# Patient Record
(demographics unavailable — no encounter records)

---

## 2024-10-22 NOTE — HISTORY OF PRESENT ILLNESS
[de-identified] : Patient referred by Dr. Marcus for evaluation of hyperparathyroidism and enlarging multinodular goiter.  Patient underwent bilateral thyroid biopsies September 2021 right nodule 3.5 cm left 2.6 cm both benign.  Repeat ultrasound November 2023: Right lobe 7.1 x 3.2 x 2.9 cm with multiple nodules mid 2.2 x 1.6 x 1.2 cm.  Left lobe 8.1 x 2.7 x 2.4 cm increased from prior study with mid 2 x 2.1 x 2.6 cm nodule and isthmus nodule measuring 2.8 x 2.7 x 2.5 cm and 1.6 x 2 x 2.1 cm.  Blood work November 2023: TSH 0.2, calcium 9.4, free T4 1.1, free T3 3.4.  Patient has been maintained on 5 mg of methimazole daily since August 2022.  Patient denies dysphagia, change in voice, shortness of breath, radiation exposure, palpitations or change in vision. 8/2024 US with increase in left lower nodule now 3 cm.  returns for FNA .  I have reviewed all old and new data and available images.

## 2024-10-22 NOTE — ASSESSMENT
[FreeTextEntry1] : Patient with several year history of hypothyroidism and multinodular goiter with gradual increase in size. US guided FNA performed.  if bneig, will repeat US 4/2025.  if enlarging, we will rediscuss total thyroidectomy for definitive diagnosis and treatment.  I have reviewed the pathophysiology of the disease process, the area anatomy and the rationale for surgery.  I discussed the risks, benefits and alternative treatments which include but are not limited to bleeding, infection, numbness, hoarseness, hypocalcemia, scarring, and need for reoperation.  I have answered the patient's questions to their satisfaction.  if no surgery. RTO 6 mo.

## 2024-10-22 NOTE — PHYSICAL EXAM
[de-identified] : No cervical or supraclavicular adenopathy, trachea midline with left greater than right thyroid enlargement with palpable nodules. [Normal] : no neck adenopathy [de-identified] : Skin:  normal appearance.  no rash, nodules, vesicles, or erythema, Musculoskeletal:  full range of motion and no deformities appreciated Neurological:  grossly intact Psychiatric:  oriented to person, place and time with appropriate affect

## 2024-10-22 NOTE — PROCEDURE
[FreeTextEntry1] : US guided thyroid FNA [FreeTextEntry2] : enlarging left lower nodule 3 cm [FreeTextEntry3] : Patient for thyroid biopsy. Risks benefits and alternatives discussed including bleeding, infection, swelling and need for repeat biopsy. Questions answered. Consent obtained, timeout taken.  Patient supine. No anesthetic used.  Nodule localized with US guidance Sterile technique with Betadine skin prep. 22-gauge needle under ultrasound guidance with a single pass. Slides prepared sent to cytology.  Post procedure: Hemostasis obtained, patient tolerated well, no complications. Post procedure instructions given.

## 2024-12-19 NOTE — DATA REVIEWED
[FreeTextEntry1] : Outside venous duplex studies performed as recently as 11/2022 demonstrated no evidence of reflux, thrombus, or varicose veins in either leg.  Studies scanned into [employer].

## 2024-12-19 NOTE — REASON FOR VISIT
[Consultation] : a consultation visit [FreeTextEntry1] : L lateral calf discoloration, tingling in the LLE

## 2025-04-24 NOTE — HISTORY OF PRESENT ILLNESS
[Former] : former [TextBox_4] : Here for f/u was sick last week and at 1st went to [facility] and had negative Xray and was given prednisone which was not helping was on 5 days and ended up going to ER Friday and was there overnight and d/c on Augmentin bid and azithromycin and prednisone 20 mg bid and today last day. 3 days. 8 days total prednisone Cough is improved. Positive rhinitis. last night less mucus and less sob viral panel negative had CT chest  Wheezing and rattling resolved.  Mild PAZ up hills Not regularly exercising.   [TextBox_11] : 1 [TextBox_13] : 4 [YearQuit] : 1960

## 2025-04-24 NOTE — PHYSICAL EXAM
[No Acute Distress] : no acute distress [Supple] : supple [No JVD] : no jvd [Normal S1, S2] : normal s1, s2 [No Murmurs] : no murmurs [Normal to Percussion] : normal to percussion [No Abnormalities] : no abnormalities [Benign] : benign [Not Tender] : not tender [No HSM] : no hsm [No Clubbing] : no clubbing [No Cyanosis] : no cyanosis [No Edema] : no edema [TextBox_68] : Occ wheeze predom. on forced exp.

## 2025-04-24 NOTE — DISCUSSION/SUMMARY
[FreeTextEntry1] : Pneumonia.  Bilateral groundglass opacities.  Likely infectious in origin. Old granulomatous disease clinically radiographically and functionally stable Dyspnea on exertion likely related to weight and conditioning.  Recommend cardiology follow-up. Cough still present but improved. Thyroid disease without evidence of upper airway obstruction.

## 2025-04-24 NOTE — ASSESSMENT
[FreeTextEntry1] : Start symbicort 2 BID Will need f/u CT F/U 3 weeks sooner PRN Hold on further corticosteroid therapy at this time. Going to travel in June.  Continue program of exercise and weight loss. Cardiology follow-up.

## 2025-04-24 NOTE — HISTORY OF PRESENT ILLNESS
[Former] : former [TextBox_4] : Here for f/u was sick last week and at 1st went to [facility] and had negative Xray and was given prednisone which was not helping was on 5 days and ended up going to ER Friday and was there overnight and d/c on Augmentin bid and azithromycin and prednisone 20 mg bid and today last day. 3 days. 8 days total prednisone Cough is improved. Positive rhinitis. last night less mucus and less sob viral panel negative had CT chest  Wheezing and rattling resolved.  Mild PAZ up hills Not regularly exercising.   [TextBox_11] : 1 [TextBox_13] : 4 [YearQuit] : 1960

## 2025-04-24 NOTE — PROCEDURE
[FreeTextEntry1] : 07/26/2024 Pulmonary function testing FEV1, FVC, and FEV1/FVC are within normal limits. TLC and subdivisions are normal. RV/TLC ratio is normal. Single breath diffusion capacity is normal.  No significant change in function compared to June 2023.     ACC: [id number]     EXAM:  CT CHEST   ORDERED BY: LES CALVIN  PROCEDURE DATE:  04/18/2025    INTERPRETATION:  CLINICAL INFORMATION: Shortness of breath, wheezing, history of sarcoidosis  COMPARISON: CT chest 4/2/2019, ultrasound neck 8/23/2024 for correlation  CONTRAST/COMPLICATIONS: IV Contrast: NONE Oral Contrast: NONE .  PROCEDURE: CT scan of the chest was obtained without intravenous contrast.  FINDINGS:  AIRWAYS/LUNGS/PLEURA: Patent central airways. Mild bronchiectasis. There is confluent groundglass opacity in the left upper lobe. There are are groundglass opacities in the right lower lobe. Bibasilar atelectasis. Few scattered 2 mm peripheral nodules in the right lung. Calcified granuloma right and left lower lobes. No pleural effusion.  MEDIASTINUM AND CAROL: No pathologically enlarged lymph nodes. Calcified mediastinal lymph nodes.  VESSELS: Aortic calcifications.  HEART: Heart size is normal. No pericardial effusion.  VISUALIZED UPPER ABDOMEN: Partially imaged left renal cyst. Scattered tiny calcified granulomas in the liver and spleen. Colonic diverticulosis without diverticulitis. Left adrenal gland mild thickening, nonspecific.  CHEST WALL AND BONES: Degenerative changes of the spine. Heterogenous bilateral thyroid multinodular goiter with coarse calcifications.  IMPRESSION:  Confluent groundglass opacity in left upper lobe, and groundglass opacities in right lower lobe may reflect developing multifocal pneumonia. Mild bronchiectasis. Additional findings compatible with reported history of sarcoidosis.    --- End of Report ---     RENITA JOHNSON DO; Resident Radiologist This document has been electronically signed. ISIAH PUENTE MD; Attending Radiologist This document has been electronically signed. Apr 18 2025 10:45PM

## 2025-04-24 NOTE — PROCEDURE
[FreeTextEntry1] : 07/26/2024 Pulmonary function testing FEV1, FVC, and FEV1/FVC are within normal limits. TLC and subdivisions are normal. RV/TLC ratio is normal. Single breath diffusion capacity is normal.  No significant change in function compared to June 2023.     ACC: [id number]     EXAM:  CT CHEST   ORDERED BY: LES CALVIN  PROCEDURE DATE:  04/18/2025    INTERPRETATION:  CLINICAL INFORMATION: Shortness of breath, wheezing, history of sarcoidosis  COMPARISON: CT chest 4/2/2019, ultrasound neck 8/23/2024 for correlation  CONTRAST/COMPLICATIONS: IV Contrast: NONE Oral Contrast: NONE .  PROCEDURE: CT scan of the chest was obtained without intravenous contrast.  FINDINGS:  AIRWAYS/LUNGS/PLEURA: Patent central airways. Mild bronchiectasis. There is confluent groundglass opacity in the left upper lobe. There are are groundglass opacities in the right lower lobe. Bibasilar atelectasis. Few scattered 2 mm peripheral nodules in the right lung. Calcified granuloma right and left lower lobes. No pleural effusion.  MEDIASTINUM AND CAROL: No pathologically enlarged lymph nodes. Calcified mediastinal lymph nodes.  VESSELS: Aortic calcifications.  HEART: Heart size is normal. No pericardial effusion.  VISUALIZED UPPER ABDOMEN: Partially imaged left renal cyst. Scattered tiny calcified granulomas in the liver and spleen. Colonic diverticulosis without diverticulitis. Left adrenal gland mild thickening, nonspecific.  CHEST WALL AND BONES: Degenerative changes of the spine. Heterogenous bilateral thyroid multinodular goiter with coarse calcifications.  IMPRESSION:  Confluent groundglass opacity in left upper lobe, and groundglass opacities in right lower lobe may reflect developing multifocal pneumonia. Mild bronchiectasis. Additional findings compatible with reported history of sarcoidosis.    --- End of Report ---     RENITA JOHNSON DO; Resident Radiologist This document has been electronically signed. ISIAH PUENTE MD; Attending Radiologist This document has been electronically signed. Apr 18 2025 10:45PM

## 2025-05-29 NOTE — HISTORY OF PRESENT ILLNESS
[de-identified] : Patient referred by Dr. Marcus for evaluation of hyperparathyroidism and enlarging multinodular goiter.  Patient underwent bilateral thyroid biopsies September 2021 right nodule 3.5 cm left 2.6 cm both benign.  Repeat ultrasound November 2023: Right lobe 7.1 x 3.2 x 2.9 cm with multiple nodules mid 2.2 x 1.6 x 1.2 cm.  Left lobe 8.1 x 2.7 x 2.4 cm increased from prior study with mid 2 x 2.1 x 2.6 cm nodule and isthmus nodule measuring 2.8 x 2.7 x 2.5 cm and 1.6 x 2 x 2.1 cm.  Blood work November 2023: TSH 0.2, calcium 9.4, free T4 1.1, free T3 3.4.  Patient has been maintained on 5 mg of methimazole daily since August 2022.  Patient denies dysphagia, change in voice, shortness of breath, radiation exposure, palpitations or change in vision. 8/2024 US with increase in left lower nodule 3 cm.  10/2024 FNA benign.  Patient returns to discuss possible surgical intervention.  Was in the emergency room several weeks ago with congestion and increased wheezing diagnosed with bacterial pneumonia treated with antibiotics and started on inhaler.  Reports symptoms improved a little bit but now worsening has not had follow-up with her pulmonologist.  CT scan at time of emergency room visit reveals goiter extending behind the manubrium without significant tracheal pression.  Repeat ultrasound March 2025 pulm nodules and enlarged goiter with increase in nodule sizes.  Patient traveling to [place] June 2 through 23.  Would like to schedule surgery when she returns.  I have reviewed all old and new data and available images.

## 2025-05-29 NOTE — ASSESSMENT
[FreeTextEntry1] : Patient with several year history of hypothyroidism and multinodular goiter with gradual increase in size. US guided FNA benign with continued increase on recent ultrasound.  Patient would like to schedule surgery mid July when returns from her trip.  She will go to urgent care for treatment of her current upper respiratory symptoms.  Patient will require medical clearance prior to surgery.  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  I have reviewed the pathophysiology of the disease process, the area anatomy and the rationale for surgery.  I discussed the risks, benefits and alternative treatments which include but are not limited to bleeding, infection, numbness, hoarseness, hypocalcemia, scarring, and need for reoperation.  I have answered the patient's questions to their satisfaction.

## 2025-05-29 NOTE — PHYSICAL EXAM
[Normal] : no neck adenopathy [de-identified] : No cervical or supraclavicular adenopathy, trachea midline with left greater than right thyroid enlargement extending behind clavicle [de-identified] : Skin:  normal appearance.  no rash, nodules, vesicles, or erythema, Musculoskeletal:  full range of motion and no deformities appreciated Neurological:  grossly intact Psychiatric:  oriented to person, place and time with appropriate affect

## 2025-06-02 NOTE — ASSESSMENT
[FreeTextEntry1] : restart. Symbicort 2 BID with rinse Will need f/u CT fu swab sent change prednisone 40 mg for 3 days, 30 mg for 3 days 20 mg for 3 days then 10 mg for 3 days Ceftin 500m for 7 days and will give an extra 7 days if needs further albuterol prn Mucinex daily  r/t after trip for f/u with Dr Otero

## 2025-06-02 NOTE — PHYSICAL EXAM
[No Acute Distress] : no acute distress [Supple] : supple [No JVD] : no jvd [Normal S1, S2] : normal s1, s2 [No Murmurs] : no murmurs [Normal to Percussion] : normal to percussion [No Abnormalities] : no abnormalities [Benign] : benign [Not Tender] : not tender [No HSM] : no hsm [No Clubbing] : no clubbing [No Cyanosis] : no cyanosis [No Edema] : no edema [TextBox_68] : wheeze right base with few crackles

## 2025-06-02 NOTE — PROCEDURE
[FreeTextEntry1] : PA and lateral chest xray performed using standard projections. Bones and soft tissues structures are unremarkable.Cardiac silhouette appears normal. Lung fields are clear. No infiltrate or masses noted. Mediastinal contours are normal. IMPRESSION: no acute cardiopulmonary disease PA and lateral chest xray performed using standard projections. Bones and soft tissues structures are unremarkable.Cardiac silhouette appears normal. Lung fields are clear. No infiltrate or masses noted. Mediastinal contours are normal. IMPRESSION: no acute cardiopulmonary disease  RVP today  07/26/2024 Pulmonary function testing FEV1, FVC, and FEV1/FVC are within normal limits. TLC and subdivisions are normal. RV/TLC ratio is normal. Single breath diffusion capacity is normal.  No significant change in function compared to June 2023.     ACC: [id number]     EXAM:  CT CHEST   ORDERED BY: LES CALVIN  PROCEDURE DATE:  04/18/2025    INTERPRETATION:  CLINICAL INFORMATION: Shortness of breath, wheezing, history of sarcoidosis  COMPARISON: CT chest 4/2/2019, ultrasound neck 8/23/2024 for correlation  CONTRAST/COMPLICATIONS: IV Contrast: NONE Oral Contrast: NONE .  PROCEDURE: CT scan of the chest was obtained without intravenous contrast.  FINDINGS:  AIRWAYS/LUNGS/PLEURA: Patent central airways. Mild bronchiectasis. There is confluent groundglass opacity in the left upper lobe. There are are groundglass opacities in the right lower lobe. Bibasilar atelectasis. Few scattered 2 mm peripheral nodules in the right lung. Calcified granuloma right and left lower lobes. No pleural effusion.  MEDIASTINUM AND CAROL: No pathologically enlarged lymph nodes. Calcified mediastinal lymph nodes.  VESSELS: Aortic calcifications.  HEART: Heart size is normal. No pericardial effusion.  VISUALIZED UPPER ABDOMEN: Partially imaged left renal cyst. Scattered tiny calcified granulomas in the liver and spleen. Colonic diverticulosis without diverticulitis. Left adrenal gland mild thickening, nonspecific.  CHEST WALL AND BONES: Degenerative changes of the spine. Heterogenous bilateral thyroid multinodular goiter with coarse calcifications.  IMPRESSION:  Confluent groundglass opacity in left upper lobe, and groundglass opacities in right lower lobe may reflect developing multifocal pneumonia. Mild bronchiectasis. Additional findings compatible with reported history of sarcoidosis.    --- End of Report ---     RENITA JOHNSON DO; Resident Radiologist This document has been electronically signed. ISIAH PUENTE MD; Attending Radiologist This document has been electronically signed. Apr 18 2025 10:45PM

## 2025-06-02 NOTE — HISTORY OF PRESENT ILLNESS
[Former] : former [TextBox_4] : Here for f/u plan is to go to [place] tomorrow for 3 weeks on malaria meds started yesterday [facility] with [facility] congestion and nasal congestion and wheeze, prior to Thursday was doing better was given prednisone 40 mg in office and then 20 mg of prednisone for 5 days, has today and tomorrow left does feel better on steroids cough is less but has congestion and yellow mucus has not seen cardio since last visit scheduled for thyroidectomy July 14 did get albuterol at [facility]  no Gerd           [TextBox_11] : 1 [TextBox_13] : 4 [YearQuit] : 1960

## 2025-06-23 NOTE — DISCUSSION/SUMMARY
[FreeTextEntry1] : Pneumonia.  Bilateral groundglass opacities.  Likely infectious in origin.  Clinically resolved. Old granulomatous disease clinically radiographically and functionally stable Dyspnea on exertion clinically and functionally stable. Cough essentially resolved. Thyroid disease without evidence of upper airway obstruction.

## 2025-06-23 NOTE — ASSESSMENT
[FreeTextEntry1] : f/u CT Continue observation Preop for thyroid surgery. Continue present bronchodilator therapy.  Pulmonary status maximized. Pulmonary function adequate to tolerate proposed surgical procedure.  No pulmonary contraindications to surgery pending repeat CT.  Continue present bronchodilator therapy.  All discussed with patient.

## 2025-06-23 NOTE — HISTORY OF PRESENT ILLNESS
[Former] : former [TextBox_4] : Here for f/u Feeling much better No cough, wheezing or CP Feels essentially baseline had a good trip.  Mild PAZ up hills Not regularly exercising.  Scheduled for thyroidectomy. 7/14/25 No recent need MDI [TextBox_11] : 1 [TextBox_13] : 4 [YearQuit] : 1960

## 2025-07-15 NOTE — PHYSICAL EXAM
[de-identified] : dressing intact, JOVITA serous. No cervical or supraclavicular adenopathy, trachea midline, no palpable masses [Normal] : no neck adenopathy [de-identified] : Skin:  normal appearance.  no rash, nodules, vesicles, or erythema, Musculoskeletal:  full range of motion and no deformities appreciated Neurological:  grossly intact Psychiatric:  oriented to person, place and time with appropriate affect

## 2025-07-15 NOTE — HISTORY OF PRESENT ILLNESS
[de-identified] : Patient referred by Dr. Marcus for evaluation of hyperparathyroidism and enlarging multinodular goiter.  Patient underwent bilateral thyroid biopsies September 2021 right nodule 3.5 cm left 2.6 cm both benign.  Repeat ultrasound November 2023: Right lobe 7.1 x 3.2 x 2.9 cm with multiple nodules mid 2.2 x 1.6 x 1.2 cm.  Left lobe 8.1 x 2.7 x 2.4 cm increased from prior study with mid 2 x 2.1 x 2.6 cm nodule and isthmus nodule measuring 2.8 x 2.7 x 2.5 cm and 1.6 x 2 x 2.1 cm.  Blood work November 2023: TSH 0.2, calcium 9.4, free T4 1.1, free T3 3.4.  Patient has been maintained on 5 mg of methimazole daily since August 2022.  Patient denies dysphagia, change in voice, shortness of breath, radiation exposure, palpitations or change in vision. 8/2024 US with increase in left lower nodule 3 cm.  10/2024 FNA benign.  Patient returns to discuss possible surgical intervention.  Was in the emergency room several weeks ago with congestion and increased wheezing diagnosed with bacterial pneumonia treated with antibiotics and started on inhaler.  Reports symptoms improved a little bit but now worsening has not had follow-up with her pulmonologist.  CT scan at time of emergency room visit reveals goiter extending behind the manubrium without significant tracheal pression.  Repeat ultrasound March 2025 pulm nodules and enlarged goiter with increase in nodule sizes.  7/14/25 total thyroidecotmy with resectionsubsternal goiter.  JOVITA serous.  Patient denies dysphagia, hoarseness, pain or parathesias. I have reviewed all old and new data and available images.

## 2025-07-15 NOTE — ASSESSMENT
Medication Reconciliation    List of medications patient is currently taking is complete.     Source(s) of information: [facility] at [address] [place] [address], [facility], PharmD, [facility] [FreeTextEntry1] : Patient with several year history of hypothyroidism and multinodular goiter with gradual increase in size.  Doing well status post total thyroidectomy with resection substernal goiter.  JOVITA removed.  Blood work sent.  Patient will week. I have answered their questions to the best of my ability.

## 2025-07-22 NOTE — ASSESSMENT
[FreeTextEntry1] : Patient with several year history of hypothyroidism and multinodular goiter with gradual increase in size.  Doing well status post total thyroidectomy with resection substernal goiter.  Blood work sent.  Patient will contact office to review and will adjust supplements as needed.  Will start thyroid hormone replacement.  RTO 6 weeks.  Will review pathology when available.. I have answered their questions to the best of my ability.

## 2025-07-22 NOTE — PHYSICAL EXAM
[de-identified] : Incision healing with min swelling, scar min discussed.. No cervical or supraclavicular adenopathy, trachea midline, no palpable masses  [Normal] : no neck adenopathy [de-identified] : Skin:  normal appearance.  no rash, nodules, vesicles, or erythema, Musculoskeletal:  full range of motion and no deformities appreciated Neurological:  grossly intact Psychiatric:  oriented to person, place and time with appropriate affect

## 2025-07-22 NOTE — HISTORY OF PRESENT ILLNESS
[de-identified] : Patient referred by Dr. Marcus for evaluation of hyperparathyroidism and enlarging multinodular goiter.  Patient underwent bilateral thyroid biopsies September 2021 right nodule 3.5 cm left 2.6 cm both benign.  Repeat ultrasound November 2023: Right lobe 7.1 x 3.2 x 2.9 cm with multiple nodules mid 2.2 x 1.6 x 1.2 cm.  Left lobe 8.1 x 2.7 x 2.4 cm increased from prior study with mid 2 x 2.1 x 2.6 cm nodule and isthmus nodule measuring 2.8 x 2.7 x 2.5 cm and 1.6 x 2 x 2.1 cm.  Blood work November 2023: TSH 0.2, calcium 9.4, free T4 1.1, free T3 3.4.  Patient has been maintained on 5 mg of methimazole daily since August 2022.  Patient denies dysphagia, change in voice, shortness of breath, radiation exposure, palpitations or change in vision. 8/2024 US with increase in left lower nodule 3 cm.  10/2024 FNA benign.  Patient returns to discuss possible surgical intervention.  Was in the emergency room several weeks ago with congestion and increased wheezing diagnosed with bacterial pneumonia treated with antibiotics and started on inhaler.  Reports symptoms improved a little bit but now worsening has not had follow-up with her pulmonologist.  CT scan at time of emergency room visit reveals goiter extending behind the manubrium without significant tracheal pression.  Repeat ultrasound March 2025 pulm nodules and enlarged goiter with increase in nodule sizes.  7/14/25 total thyroidectomy with resection substernal goiter.  path pending.   Patient denies dysphagia, hoarseness, pain or parathesias. I have reviewed all old and new data and available images.

## 2025-07-29 NOTE — PHYSICAL EXAM
[de-identified] : Incision healing with min swelling, no fluctuance.  Scar min discussed.. No cervical or supraclavicular adenopathy, trachea midline, no palpable masses  [Normal] : no neck adenopathy [de-identified] : Skin:  normal appearance.  no rash, nodules, vesicles, or erythema, Musculoskeletal:  full range of motion and no deformities appreciated Neurological:  grossly intact Psychiatric:  oriented to person, place and time with appropriate affect

## 2025-07-29 NOTE — ASSESSMENT
[FreeTextEntry1] : Patient with several year history of hypothyroidism and multinodular goiter with gradual increase in size.  Doing well status post total thyroidectomy with resection substernal goiter.  Blood work sent.  Patient will contact office to review and will adjust supplements as needed.  Will start thyroid hormone replacement.  Will prescribe antibiotic for mild redness of incision.  RTO 6 weeks.  Will review pathology when available.. I have answered their questions to the best of my ability.

## 2025-07-29 NOTE — HISTORY OF PRESENT ILLNESS
[de-identified] : Patient referred by Dr. Marcus for evaluation of hyperparathyroidism and enlarging multinodular goiter.  Patient underwent bilateral thyroid biopsies September 2021 right nodule 3.5 cm left 2.6 cm both benign.  Repeat ultrasound November 2023: Right lobe 7.1 x 3.2 x 2.9 cm with multiple nodules mid 2.2 x 1.6 x 1.2 cm.  Left lobe 8.1 x 2.7 x 2.4 cm increased from prior study with mid 2 x 2.1 x 2.6 cm nodule and isthmus nodule measuring 2.8 x 2.7 x 2.5 cm and 1.6 x 2 x 2.1 cm.  Blood work November 2023: TSH 0.2, calcium 9.4, free T4 1.1, free T3 3.4.  Patient has been maintained on 5 mg of methimazole daily since August 2022.  Patient denies dysphagia, change in voice, shortness of breath, radiation exposure, palpitations or change in vision. 8/2024 US with increase in left lower nodule 3 cm.  10/2024 FNA benign.  Patient returns to discuss possible surgical intervention.  Was in the emergency room several weeks ago with congestion and increased wheezing diagnosed with bacterial pneumonia treated with antibiotics and started on inhaler.  Reports symptoms improved a little bit but now worsening has not had follow-up with her pulmonologist.  CT scan at time of emergency room visit reveals goiter extending behind the manubrium without significant tracheal pression.  Repeat ultrasound March 2025 pulm nodules and enlarged goiter with increase in nodule sizes.  7/14/25 total thyroidectomy with resection substernal goiter.  path benign.   Patient denies dysphagia, hoarseness, pain.  Patient did have paresthesias, none recent.  Returns prior to scheduled appointment with concerned about incision.  Reports lumpiness.  Denies fever, chills or drainage.. I have reviewed all old and new data and available images.